# Patient Record
Sex: FEMALE | Race: WHITE | NOT HISPANIC OR LATINO | Employment: FULL TIME | URBAN - NONMETROPOLITAN AREA
[De-identification: names, ages, dates, MRNs, and addresses within clinical notes are randomized per-mention and may not be internally consistent; named-entity substitution may affect disease eponyms.]

---

## 2017-03-15 RX ORDER — PREGABALIN 100 MG/1
100 CAPSULE ORAL 2 TIMES DAILY
Qty: 60 CAPSULE | Refills: 0 | Status: SHIPPED | OUTPATIENT
Start: 2017-03-15 | End: 2017-03-20 | Stop reason: SDUPTHER

## 2017-03-20 ENCOUNTER — OFFICE VISIT (OUTPATIENT)
Dept: FAMILY MEDICINE CLINIC | Facility: CLINIC | Age: 42
End: 2017-03-20

## 2017-03-20 VITALS
WEIGHT: 163 LBS | SYSTOLIC BLOOD PRESSURE: 110 MMHG | HEIGHT: 63 IN | DIASTOLIC BLOOD PRESSURE: 80 MMHG | BODY MASS INDEX: 28.88 KG/M2

## 2017-03-20 DIAGNOSIS — M54.9 PAIN, UPPER BACK: ICD-10-CM

## 2017-03-20 DIAGNOSIS — Z12.39 SCREENING BREAST EXAMINATION: Primary | ICD-10-CM

## 2017-03-20 PROCEDURE — 99214 OFFICE O/P EST MOD 30 MIN: CPT | Performed by: NURSE PRACTITIONER

## 2017-03-20 RX ORDER — PREGABALIN 100 MG/1
100 CAPSULE ORAL 3 TIMES DAILY
Qty: 90 CAPSULE | Refills: 2 | Status: SHIPPED | OUTPATIENT
Start: 2017-03-20 | End: 2017-05-03 | Stop reason: SDUPTHER

## 2017-03-20 RX ORDER — PREGABALIN 100 MG/1
100 CAPSULE ORAL 2 TIMES DAILY
Qty: 60 CAPSULE | Refills: 2 | Status: SHIPPED | OUTPATIENT
Start: 2017-03-20 | End: 2017-03-20

## 2017-03-20 RX ORDER — TIZANIDINE 2 MG/1
2 TABLET ORAL EVERY 6 HOURS PRN
Qty: 90 TABLET | Refills: 5 | Status: SHIPPED | OUTPATIENT
Start: 2017-03-20 | End: 2021-08-04

## 2017-03-20 NOTE — PROGRESS NOTES
Chief Complaint   Patient presents with   • Follow-up     3 month anthony    • Fibromyalgia   • Med Refill     Subjective   Felecia Patel is a 41 y.o. female.     Fibromyalgia   This is a recurrent problem. The current episode started more than 1 month ago. The problem occurs constantly. The problem has been gradually worsening. Associated symptoms include arthralgias, fatigue, joint swelling, myalgias and neck pain. Pertinent negatives include no abdominal pain, chest pain, chills, congestion, coughing, diaphoresis, fever, headaches, nausea, numbness, rash, sore throat, swollen glands or weakness. Nothing aggravates the symptoms. Treatments tried: lyrica  The treatment provided mild relief.   Pain   This is a chronic problem. The current episode started more than 1 year ago. The problem occurs constantly. The problem has been gradually worsening. Associated symptoms include arthralgias, fatigue, joint swelling, myalgias and neck pain. Pertinent negatives include no abdominal pain, chest pain, chills, congestion, coughing, diaphoresis, fever, headaches, nausea, numbness, rash, sore throat, swollen glands or weakness. The symptoms are aggravated by bending. She has tried acetaminophen, heat, ice, eating, immobilization, lying down, NSAIDs, oral narcotics, position changes, relaxation, rest, sleep and walking for the symptoms. The treatment provided mild relief.        The following portions of the patient's history were reviewed and updated as appropriate: allergies, current medications, past social history and problem list.    Review of Systems   Constitutional: Positive for fatigue. Negative for chills, diaphoresis and fever.   HENT: Negative.  Negative for congestion and sore throat.    Eyes: Negative.    Respiratory: Negative.  Negative for cough.    Cardiovascular: Negative.  Negative for chest pain.   Gastrointestinal: Negative.  Negative for abdominal pain and nausea.   Endocrine: Negative.   "  Genitourinary: Negative.    Musculoskeletal: Positive for arthralgias, back pain, joint swelling, myalgias and neck pain. Negative for gait problem and neck stiffness.   Skin: Negative.  Negative for rash.   Allergic/Immunologic: Negative.    Neurological: Negative for weakness, numbness and headaches.   Hematological: Negative.    Psychiatric/Behavioral: Positive for decreased concentration and sleep disturbance. The patient is nervous/anxious.        Objective   Visit Vitals   • /80   • Ht 63\" (160 cm)   • Wt 163 lb (73.9 kg)   • BMI 28.87 kg/m2     Physical Exam   Constitutional: She is oriented to person, place, and time. She appears well-developed and well-nourished.   HENT:   Head: Normocephalic and atraumatic.   Eyes: EOM are normal. Pupils are equal, round, and reactive to light.   Neck: Normal range of motion. Neck supple.   Cardiovascular: Normal rate, regular rhythm, normal heart sounds and normal pulses.    Pulmonary/Chest: Effort normal and breath sounds normal. She has no decreased breath sounds. She has no wheezes. Chest wall is not dull to percussion. She exhibits no mass, no tenderness, no bony tenderness, no laceration, no crepitus, no edema, no deformity, no swelling and no retraction. Right breast exhibits tenderness. Right breast exhibits no inverted nipple, no mass, no nipple discharge and no skin change. Left breast exhibits tenderness. Left breast exhibits no inverted nipple, no mass, no nipple discharge and no skin change. Breasts are symmetrical. There is no breast swelling.   Patient has breast implants-has had them over 20 years and wants an ultrasound instead of a mammogram due to risk of rupture    Abdominal: Soft. Bowel sounds are normal.   Genitourinary: Rectum normal. No breast tenderness, discharge or bleeding. Pelvic exam was performed with patient supine. Uterus is not deviated, not enlarged, not fixed and not tender. Cervix exhibits no motion tenderness, no discharge and " no friability. Right adnexum displays no mass. Left adnexum displays no mass.   Musculoskeletal: Normal range of motion. She exhibits no edema or deformity.        Cervical back: She exhibits tenderness, pain and spasm.        Thoracic back: She exhibits tenderness, pain and spasm.        Lumbar back: She exhibits tenderness, pain and spasm.   Neurological: She is alert and oriented to person, place, and time. She has normal reflexes.   Skin: Skin is warm and dry.   Psychiatric: She has a normal mood and affect.   Nursing note and vitals reviewed.      Assessment/Plan   Problem List Items Addressed This Visit     None      Visit Diagnoses     Screening breast examination    -  Primary    Relevant Orders    US breast bilateral complete    Pain, upper back        Relevant Orders    Ambulatory Referral to Physical Therapy           New Medications Ordered This Visit   Medications   • tiZANidine (ZANAFLEX) 2 MG tablet     Sig: Take 1 tablet by mouth Every 6 (Six) Hours As Needed for Muscle Spasms.     Dispense:  90 tablet     Refill:  5   • diclofenac (VOLTAREN) 50 MG EC tablet     Sig: Take 1 tablet by mouth 3 (Three) Times a Day.     Dispense:  90 tablet     Refill:  5   • pregabalin (LYRICA) 100 MG capsule     Sig: Take 1 capsule by mouth 3 (Three) Times a Day.     Dispense:  90 capsule     Refill:  2       Patient understands the risks associated with this controlled medication, including tolerance and addiction.  she also agrees to only obtain this medication from me, and not from a another provider, unless that provider is covering for me in my absence.  she also agrees to be compliant in dosing, and not self adjust the dose of medication.  A signed controlled substance agreement is on file, and she has received a controlled substance education sheet at this a previous visit.  she has also signed a consent for treatment with a controlled substance as per [facility] policy. LISA was obtained.

## 2017-05-03 ENCOUNTER — LAB (OUTPATIENT)
Dept: LAB | Facility: HOSPITAL | Age: 42
End: 2017-05-03

## 2017-05-03 ENCOUNTER — OFFICE VISIT (OUTPATIENT)
Dept: FAMILY MEDICINE CLINIC | Facility: CLINIC | Age: 42
End: 2017-05-03

## 2017-05-03 VITALS
SYSTOLIC BLOOD PRESSURE: 110 MMHG | WEIGHT: 163 LBS | DIASTOLIC BLOOD PRESSURE: 80 MMHG | BODY MASS INDEX: 28.88 KG/M2 | HEIGHT: 63 IN

## 2017-05-03 DIAGNOSIS — M79.7 FIBROMYALGIA, PRIMARY: ICD-10-CM

## 2017-05-03 DIAGNOSIS — R41.840 DIFFICULTY CONCENTRATING: Primary | ICD-10-CM

## 2017-05-03 DIAGNOSIS — R41.840 DIFFICULTY CONCENTRATING: ICD-10-CM

## 2017-05-03 DIAGNOSIS — R53.81 MALAISE: ICD-10-CM

## 2017-05-03 DIAGNOSIS — M25.50 ARTHRALGIA, UNSPECIFIED JOINT: ICD-10-CM

## 2017-05-03 DIAGNOSIS — M25.50 ARTHRALGIA, UNSPECIFIED JOINT: Primary | ICD-10-CM

## 2017-05-03 LAB
25(OH)D3 SERPL-MCNC: 26.5 NG/ML (ref 30–100)
ALBUMIN SERPL-MCNC: 4.7 G/DL (ref 3.4–4.8)
ALBUMIN/GLOB SERPL: 1.4 G/DL (ref 1.1–1.8)
ALP SERPL-CCNC: 66 U/L (ref 38–126)
ALT SERPL W P-5'-P-CCNC: 26 U/L (ref 9–52)
ANION GAP SERPL CALCULATED.3IONS-SCNC: 15 MMOL/L (ref 5–15)
AST SERPL-CCNC: 35 U/L (ref 14–36)
BASOPHILS # BLD AUTO: 0.01 10*3/MM3 (ref 0–0.2)
BASOPHILS NFR BLD AUTO: 0.2 % (ref 0–2)
BILIRUB SERPL-MCNC: 0.6 MG/DL (ref 0.2–1.3)
BUN BLD-MCNC: 12 MG/DL (ref 7–21)
BUN/CREAT SERPL: 16.9 (ref 7–25)
CALCIUM SPEC-SCNC: 9.5 MG/DL (ref 8.4–10.2)
CHLORIDE SERPL-SCNC: 102 MMOL/L (ref 95–110)
CO2 SERPL-SCNC: 23 MMOL/L (ref 22–31)
CREAT BLD-MCNC: 0.71 MG/DL (ref 0.5–1)
DEPRECATED RDW RBC AUTO: 41 FL (ref 36.4–46.3)
EOSINOPHIL # BLD AUTO: 0.1 10*3/MM3 (ref 0–0.7)
EOSINOPHIL NFR BLD AUTO: 1.6 % (ref 0–7)
ERYTHROCYTE [DISTWIDTH] IN BLOOD BY AUTOMATED COUNT: 12.7 % (ref 11.5–14.5)
GFR SERPL CREATININE-BSD FRML MDRD: 91 ML/MIN/1.73 (ref 58–135)
GLOBULIN UR ELPH-MCNC: 3.3 GM/DL (ref 2.3–3.5)
GLUCOSE BLD-MCNC: 114 MG/DL (ref 60–100)
HCT VFR BLD AUTO: 41.6 % (ref 35–45)
HGB BLD-MCNC: 14.4 G/DL (ref 12–15.5)
IMM GRANULOCYTES # BLD: 0.01 10*3/MM3 (ref 0–0.02)
IMM GRANULOCYTES NFR BLD: 0.2 % (ref 0–0.5)
IRON 24H UR-MRATE: 75 MCG/DL (ref 37–170)
LYMPHOCYTES # BLD AUTO: 2.36 10*3/MM3 (ref 0.6–4.2)
LYMPHOCYTES NFR BLD AUTO: 38.1 % (ref 10–50)
MAGNESIUM SERPL-MCNC: 2 MG/DL (ref 1.6–2.3)
MCH RBC QN AUTO: 30.4 PG (ref 26.5–34)
MCHC RBC AUTO-ENTMCNC: 34.6 G/DL (ref 31.4–36)
MCV RBC AUTO: 87.9 FL (ref 80–98)
MONOCYTES # BLD AUTO: 0.44 10*3/MM3 (ref 0–0.9)
MONOCYTES NFR BLD AUTO: 7.1 % (ref 0–12)
NEUTROPHILS # BLD AUTO: 3.28 10*3/MM3 (ref 2–8.6)
NEUTROPHILS NFR BLD AUTO: 52.8 % (ref 37–80)
PLATELET # BLD AUTO: 290 10*3/MM3 (ref 150–450)
PMV BLD AUTO: 10.4 FL (ref 8–12)
POTASSIUM BLD-SCNC: 3.5 MMOL/L (ref 3.5–5.1)
PROT SERPL-MCNC: 8 G/DL (ref 6.3–8.6)
RBC # BLD AUTO: 4.73 10*6/MM3 (ref 3.77–5.16)
SODIUM BLD-SCNC: 140 MMOL/L (ref 137–145)
TSH SERPL DL<=0.05 MIU/L-ACNC: 0.89 MIU/ML (ref 0.46–4.68)
URATE SERPL-MCNC: 5.6 MG/DL (ref 2.5–8.5)
VIT B12 BLD-MCNC: 305 PG/ML (ref 239–931)
WBC NRBC COR # BLD: 6.2 10*3/MM3 (ref 3.2–9.8)

## 2017-05-03 PROCEDURE — 86038 ANTINUCLEAR ANTIBODIES: CPT | Performed by: NURSE PRACTITIONER

## 2017-05-03 PROCEDURE — 83735 ASSAY OF MAGNESIUM: CPT | Performed by: NURSE PRACTITIONER

## 2017-05-03 PROCEDURE — 85025 COMPLETE CBC W/AUTO DIFF WBC: CPT | Performed by: NURSE PRACTITIONER

## 2017-05-03 PROCEDURE — 99213 OFFICE O/P EST LOW 20 MIN: CPT | Performed by: NURSE PRACTITIONER

## 2017-05-03 PROCEDURE — 86431 RHEUMATOID FACTOR QUANT: CPT | Performed by: NURSE PRACTITIONER

## 2017-05-03 PROCEDURE — 83540 ASSAY OF IRON: CPT | Performed by: NURSE PRACTITIONER

## 2017-05-03 PROCEDURE — 87476 LYME DIS DNA AMP PROBE: CPT | Performed by: NURSE PRACTITIONER

## 2017-05-03 PROCEDURE — 80053 COMPREHEN METABOLIC PANEL: CPT | Performed by: NURSE PRACTITIONER

## 2017-05-03 PROCEDURE — 82306 VITAMIN D 25 HYDROXY: CPT | Performed by: NURSE PRACTITIONER

## 2017-05-03 PROCEDURE — 84443 ASSAY THYROID STIM HORMONE: CPT | Performed by: NURSE PRACTITIONER

## 2017-05-03 PROCEDURE — 84550 ASSAY OF BLOOD/URIC ACID: CPT | Performed by: NURSE PRACTITIONER

## 2017-05-03 PROCEDURE — 82607 VITAMIN B-12: CPT | Performed by: NURSE PRACTITIONER

## 2017-05-03 PROCEDURE — 36415 COLL VENOUS BLD VENIPUNCTURE: CPT | Performed by: NURSE PRACTITIONER

## 2017-05-03 RX ORDER — PREGABALIN 100 MG/1
100 CAPSULE ORAL 3 TIMES DAILY
Qty: 90 CAPSULE | Refills: 2 | Status: SHIPPED | OUTPATIENT
Start: 2017-05-03 | End: 2017-06-19 | Stop reason: SDUPTHER

## 2017-05-04 ENCOUNTER — TELEPHONE (OUTPATIENT)
Dept: FAMILY MEDICINE CLINIC | Facility: CLINIC | Age: 42
End: 2017-05-04

## 2017-05-05 LAB
ANA SER QL: NEGATIVE
RICK SF IGG TITR SER IF: NEGATIVE {TITER}

## 2017-05-07 LAB — B BURGDOR DNA SPEC QL NAA+PROBE: NEGATIVE

## 2017-05-08 LAB
R RICKETTSI IGM TITR SER: 0.8 INDEX (ref 0–0.89)
RHEUMATOID FACT SERPL-ACNC: POSITIVE [IU]/ML
RHEUMATOID FACT TITR SER: NORMAL IU/ML

## 2017-05-09 ENCOUNTER — TELEPHONE (OUTPATIENT)
Dept: FAMILY MEDICINE CLINIC | Facility: CLINIC | Age: 42
End: 2017-05-09

## 2017-05-10 ENCOUNTER — TELEPHONE (OUTPATIENT)
Dept: FAMILY MEDICINE CLINIC | Facility: CLINIC | Age: 42
End: 2017-05-10

## 2017-05-23 ENCOUNTER — TELEPHONE (OUTPATIENT)
Dept: FAMILY MEDICINE CLINIC | Facility: CLINIC | Age: 42
End: 2017-05-23

## 2017-06-08 ENCOUNTER — TELEPHONE (OUTPATIENT)
Dept: FAMILY MEDICINE CLINIC | Facility: CLINIC | Age: 42
End: 2017-06-08

## 2017-06-19 ENCOUNTER — OFFICE VISIT (OUTPATIENT)
Dept: FAMILY MEDICINE CLINIC | Facility: CLINIC | Age: 42
End: 2017-06-19

## 2017-06-19 VITALS
SYSTOLIC BLOOD PRESSURE: 112 MMHG | WEIGHT: 159 LBS | BODY MASS INDEX: 28.17 KG/M2 | DIASTOLIC BLOOD PRESSURE: 80 MMHG | HEIGHT: 63 IN

## 2017-06-19 DIAGNOSIS — M79.7 FIBROMYALGIA: Primary | ICD-10-CM

## 2017-06-19 DIAGNOSIS — R41.840 DIFFICULTY CONCENTRATING: ICD-10-CM

## 2017-06-19 DIAGNOSIS — F41.9 ANXIETY: ICD-10-CM

## 2017-06-19 PROCEDURE — 99213 OFFICE O/P EST LOW 20 MIN: CPT | Performed by: NURSE PRACTITIONER

## 2017-06-19 RX ORDER — PREGABALIN 100 MG/1
100 CAPSULE ORAL 3 TIMES DAILY
Qty: 90 CAPSULE | Refills: 2 | Status: SHIPPED | OUTPATIENT
Start: 2017-06-19 | End: 2017-09-02 | Stop reason: SDUPTHER

## 2017-06-19 RX ORDER — FLUOXETINE 10 MG/1
10 CAPSULE ORAL DAILY
Qty: 30 CAPSULE | Refills: 11 | Status: SHIPPED | OUTPATIENT
Start: 2017-06-19 | End: 2021-08-04

## 2017-06-19 NOTE — PROGRESS NOTES
Chief Complaint   Patient presents with   • Follow-up     3 month check up   • Med Refill     lyrica     Subjective   Felecia Patel is a 41 y.o. female.     ADD   This is a new problem. The problem occurs constantly. The problem has been gradually worsening. Associated symptoms include arthralgias, fatigue, joint swelling, myalgias and neck pain. Pertinent negatives include no abdominal pain, anorexia, chest pain, chills, congestion, coughing, diaphoresis, fever, headaches, nausea, numbness, rash, sore throat, swollen glands, urinary symptoms, vertigo, visual change, vomiting or weakness. The symptoms are aggravated by stress. The treatment provided moderate relief.   Pain   This is a chronic problem. The current episode started more than 1 year ago. The problem occurs constantly. The problem has been gradually worsening. Associated symptoms include arthralgias, fatigue, joint swelling, myalgias and neck pain. Pertinent negatives include no abdominal pain, anorexia, chest pain, chills, congestion, coughing, diaphoresis, fever, headaches, nausea, numbness, rash, sore throat, swollen glands, urinary symptoms, vertigo, visual change, vomiting or weakness. The symptoms are aggravated by bending. She has tried acetaminophen, heat, ice, eating, immobilization, lying down, NSAIDs, oral narcotics, position changes, relaxation, rest, sleep and walking for the symptoms. The treatment provided mild relief.   Anxiety   Presents for follow-up visit. Symptoms include decreased concentration, depressed mood, excessive worry, insomnia, irritability, malaise, nervous/anxious behavior, obsessions, panic and restlessness. Patient reports no chest pain, compulsions, confusion, dizziness, dry mouth, feeling of choking, hyperventilation, impotence, muscle tension, nausea, palpitations, shortness of breath or suicidal ideas. Symptoms occur most days. The severity of symptoms is moderate. The quality of sleep is good. Nighttime  "awakenings: none.     Compliance with medications is %. Side effects of treatment include headaches.        The following portions of the patient's history were reviewed and updated as appropriate: allergies, current medications, past social history and problem list.    Review of Systems   Constitutional: Positive for fatigue and irritability. Negative for chills, diaphoresis and fever.   HENT: Negative.  Negative for congestion and sore throat.    Eyes: Negative.    Respiratory: Negative.  Negative for apnea, cough, choking, chest tightness, shortness of breath, wheezing and stridor.    Cardiovascular: Negative.  Negative for chest pain, palpitations and leg swelling.   Gastrointestinal: Negative.  Negative for abdominal pain, anal bleeding, anorexia, blood in stool, constipation, diarrhea, nausea and vomiting.   Endocrine: Negative.    Genitourinary: Negative.  Negative for impotence.   Musculoskeletal: Positive for arthralgias, back pain, joint swelling, myalgias and neck pain. Negative for gait problem and neck stiffness.   Skin: Negative.  Negative for rash.   Allergic/Immunologic: Negative.    Neurological: Negative.  Negative for dizziness, vertigo, tremors, seizures, syncope, speech difficulty, weakness, numbness and headaches.   Hematological: Negative.  Negative for adenopathy. Does not bruise/bleed easily.   Psychiatric/Behavioral: Positive for agitation, behavioral problems, decreased concentration and sleep disturbance. Negative for confusion, dysphoric mood, hallucinations, self-injury and suicidal ideas. The patient is nervous/anxious and has insomnia. The patient is not hyperactive.        Objective   /80  Ht 63\" (160 cm)  Wt 159 lb (72.1 kg)  BMI 28.17 kg/m2  Physical Exam   Constitutional: She is oriented to person, place, and time. She appears well-developed and well-nourished.   HENT:   Head: Normocephalic and atraumatic.   Mouth/Throat: No oropharyngeal exudate.   Eyes: " Conjunctivae and EOM are normal. Pupils are equal, round, and reactive to light. Right eye exhibits no discharge. Left eye exhibits no discharge. No scleral icterus.   Neck: Normal range of motion. Neck supple. No JVD present. No tracheal deviation present. No thyromegaly present.   Cardiovascular: Normal rate, regular rhythm, normal heart sounds and normal pulses.  Exam reveals no gallop and no friction rub.    No murmur heard.  Pulmonary/Chest: Effort normal and breath sounds normal. No stridor. She has no decreased breath sounds. She has no wheezes. Chest wall is not dull to percussion. She exhibits no mass, no tenderness, no bony tenderness, no laceration, no crepitus, no edema, no deformity, no swelling and no retraction. Right breast exhibits tenderness. Right breast exhibits no inverted nipple, no mass, no nipple discharge and no skin change. Left breast exhibits tenderness. Left breast exhibits no inverted nipple, no mass, no nipple discharge and no skin change. Breasts are symmetrical. There is no breast swelling.   Patient has breast implants-has had them over 20 years and wants an ultrasound instead of a mammogram due to risk of rupture    Abdominal: Soft. Bowel sounds are normal. She exhibits no distension and no mass. There is no tenderness. There is no rebound and no guarding. No hernia.   Genitourinary: Rectum normal. No breast tenderness, discharge or bleeding. Pelvic exam was performed with patient supine. Uterus is not deviated, not enlarged, not fixed and not tender. Cervix exhibits no motion tenderness, no discharge and no friability. Right adnexum displays no mass. Left adnexum displays no mass.   Musculoskeletal: Normal range of motion. She exhibits tenderness. She exhibits no edema or deformity.   Lymphadenopathy:     She has no cervical adenopathy.   Neurological: She is alert and oriented to person, place, and time. She has normal reflexes. She displays normal reflexes. No cranial nerve  deficit. She exhibits normal muscle tone. Coordination normal.   Skin: Skin is warm and dry. No rash noted. No erythema. No pallor.   Psychiatric: She has a normal mood and affect.   Nursing note and vitals reviewed.      Assessment/Plan   Problem List Items Addressed This Visit        Musculoskeletal and Integument    Fibromyalgia - Primary       Other    Anxiety    Difficulty concentrating           New Medications Ordered This Visit   Medications   • pregabalin (LYRICA) 100 MG capsule     Sig: Take 1 capsule by mouth 3 (Three) Times a Day.     Dispense:  90 capsule     Refill:  2   • FLUoxetine (PROZAC) 10 MG capsule     Sig: Take 1 capsule by mouth Daily.     Dispense:  30 capsule     Refill:  11       It's not just what you eat, but when you eat  Eat breakfast, and eat smaller meals throughout the day. A healthy breakfast can jumpstart your metabolism, while eating small, healthy meals (rather than the standard three large meals) keeps your energy up.   Avoid eating at night. Try to eat dinner earlier and fast for 14-16 hours until breakfast the next morning. Studies suggest that eating only when you’re most active and giving your digestive system a long break each day may help to regulate weight.     Patient understands the risks associated with this controlled medication, including tolerance and addiction.  she also agrees to only obtain this medication from me, and not from a another provider, unless that provider is covering for me in my absence.  she also agrees to be compliant in dosing, and not self adjust the dose of medication.  A signed controlled substance agreement is on file, and she has received a controlled substance education sheet at this a previous visit.  she has also signed a consent for treatment with a controlled substance as per [facility] policy. LISA was obtained.    Add prozac 10mg to meds daily.

## 2018-02-08 RX ORDER — CLONAZEPAM 1 MG/1
TABLET ORAL
Qty: 90 TABLET | Refills: 0 | OUTPATIENT
Start: 2018-02-08

## 2021-08-04 PROCEDURE — 87635 SARS-COV-2 COVID-19 AMP PRB: CPT | Performed by: NURSE PRACTITIONER
